# Patient Record
Sex: MALE | Race: WHITE | NOT HISPANIC OR LATINO | RURAL
[De-identification: names, ages, dates, MRNs, and addresses within clinical notes are randomized per-mention and may not be internally consistent; named-entity substitution may affect disease eponyms.]

---

## 2021-06-23 ENCOUNTER — OFFICE VISIT (OUTPATIENT)
Dept: RURAL CLINIC 8 | Facility: CLINIC | Age: 41
End: 2021-06-23
Payer: MEDICAID

## 2021-06-23 ENCOUNTER — DASHBOARD ENCOUNTERS (OUTPATIENT)
Age: 41
End: 2021-06-23

## 2021-06-23 DIAGNOSIS — K62.89 ANAL PAIN: ICD-10-CM

## 2021-06-23 DIAGNOSIS — K92.1 HEMATOCHEZIA: ICD-10-CM

## 2021-06-23 PROCEDURE — 99202 OFFICE O/P NEW SF 15 MIN: CPT | Performed by: INTERNAL MEDICINE

## 2021-06-23 RX ORDER — SODIUM, POTASSIUM,MAG SULFATES 17.5-3.13G
177 ML SOLUTION, RECONSTITUTED, ORAL ORAL
Qty: 1 KIT | Refills: 0 | OUTPATIENT
Start: 2021-06-23

## 2021-06-23 RX ORDER — LISINOPRIL 20 MG/1
1 TABLET TABLET ORAL ONCE A DAY
Status: ACTIVE | COMMUNITY

## 2021-06-23 RX ORDER — BISACODYL 5 MG
TAKE 4 TABLET, DELAYED RELEASE (ENTERIC COATED) ORAL
Qty: 4 | OUTPATIENT
Start: 2021-06-23 | End: 2021-06-24

## 2021-06-23 NOTE — HPI-TODAY'S VISIT:
Pt comes for blood in the stool. when he has a stool it will "burn in my butt". he says that then he gets blood on the tissue. he apparently saw a surgeon and was told that he was keeping his bottom too much. - he says that he had a clot on the tissue this morning. he says that is itching. normally has normal stool. this has lasted for about a year.

## 2021-07-20 ENCOUNTER — CLAIMS CREATED FROM THE CLAIM WINDOW (OUTPATIENT)
Dept: RURAL MEDICAL CENTER 4 | Facility: MEDICAL CENTER | Age: 41
End: 2021-07-20
Payer: MEDICAID

## 2021-07-20 ENCOUNTER — OFFICE VISIT (OUTPATIENT)
Dept: RURAL MEDICAL CENTER 4 | Facility: MEDICAL CENTER | Age: 41
End: 2021-07-20
Payer: MEDICAID

## 2021-07-20 DIAGNOSIS — K62.89 ACUTE PROCTITIS: ICD-10-CM

## 2021-07-20 DIAGNOSIS — K92.1 BLACK STOOL: ICD-10-CM

## 2021-07-20 DIAGNOSIS — D12.2 ADENOMA OF ASCENDING COLON: ICD-10-CM

## 2021-07-20 PROCEDURE — 45385 COLONOSCOPY W/LESION REMOVAL: CPT | Performed by: INTERNAL MEDICINE

## 2021-07-20 RX ORDER — SODIUM, POTASSIUM,MAG SULFATES 17.5-3.13G
177 ML SOLUTION, RECONSTITUTED, ORAL ORAL
Qty: 1 KIT | Refills: 0 | Status: ACTIVE | COMMUNITY
Start: 2021-06-23

## 2021-07-20 RX ORDER — LISINOPRIL 20 MG/1
1 TABLET TABLET ORAL ONCE A DAY
Status: ACTIVE | COMMUNITY